# Patient Record
Sex: MALE | Race: WHITE | NOT HISPANIC OR LATINO | Employment: OTHER | URBAN - METROPOLITAN AREA
[De-identification: names, ages, dates, MRNs, and addresses within clinical notes are randomized per-mention and may not be internally consistent; named-entity substitution may affect disease eponyms.]

---

## 2023-03-09 ENCOUNTER — NURSING HOME VISIT (OUTPATIENT)
Dept: POST ACUTE CARE | Facility: EXTERNAL LOCATION | Age: 83
End: 2023-03-09
Payer: MEDICARE

## 2023-03-09 DIAGNOSIS — G30.9 ALZHEIMER DISEASE (MULTI): ICD-10-CM

## 2023-03-09 DIAGNOSIS — U07.1 COVID-19: ICD-10-CM

## 2023-03-09 DIAGNOSIS — J43.9 PULMONARY EMPHYSEMA, UNSPECIFIED EMPHYSEMA TYPE (MULTI): ICD-10-CM

## 2023-03-09 DIAGNOSIS — Z72.0 TOBACCO ABUSE: ICD-10-CM

## 2023-03-09 DIAGNOSIS — E87.1 HYPONATREMIA: ICD-10-CM

## 2023-03-09 DIAGNOSIS — I10 HYPERTENSION, UNSPECIFIED TYPE: ICD-10-CM

## 2023-03-09 DIAGNOSIS — E78.5 HYPERLIPIDEMIA, UNSPECIFIED HYPERLIPIDEMIA TYPE: ICD-10-CM

## 2023-03-09 DIAGNOSIS — F02.80 ALZHEIMER DISEASE (MULTI): ICD-10-CM

## 2023-03-09 DIAGNOSIS — G93.41 METABOLIC ENCEPHALOPATHY: ICD-10-CM

## 2023-03-09 PROCEDURE — 99308 SBSQ NF CARE LOW MDM 20: CPT | Performed by: INTERNAL MEDICINE

## 2023-03-15 PROBLEM — G93.41 METABOLIC ENCEPHALOPATHY: Status: ACTIVE | Noted: 2023-03-15

## 2023-03-15 PROBLEM — G30.9 ALZHEIMER DISEASE (MULTI): Status: ACTIVE | Noted: 2023-03-15

## 2023-03-15 PROBLEM — E78.5 HYPERLIPIDEMIA: Status: ACTIVE | Noted: 2023-03-15

## 2023-03-15 PROBLEM — U07.1 COVID-19: Status: ACTIVE | Noted: 2023-03-15

## 2023-03-15 PROBLEM — E87.1 HYPONATREMIA: Status: ACTIVE | Noted: 2023-03-15

## 2023-03-15 PROBLEM — I10 HYPERTENSION: Status: ACTIVE | Noted: 2023-03-15

## 2023-03-15 PROBLEM — J43.9 PULMONARY EMPHYSEMA (MULTI): Status: ACTIVE | Noted: 2023-03-15

## 2023-03-15 PROBLEM — Z72.0 TOBACCO ABUSE: Status: ACTIVE | Noted: 2023-03-15

## 2023-03-15 PROBLEM — F02.80 ALZHEIMER DISEASE (MULTI): Status: ACTIVE | Noted: 2023-03-15

## 2023-03-15 NOTE — PROGRESS NOTES
Name: Leroy Marquez  : 1940  MRN: [id number]  Visit Date: 3/9/2023  Chief Complaint: HISTORY AND PHYSICAL    HPI: 82 year old Male with PMH of HTN, HLD , Alzheimer, Tobacco abuse, TIA, prediabetes, emphysema who presents to the ED by his family due to falls and abnormal behavior. He is found to have acute metabolic encephalopathy 2/2 COVID 19 viral infection. ID was consulted. He is given remdesivir. He maintained his O2 sat on RA. His mentation returned to baseline. He has hypoNa and it improved with IVF. PT/OT recommended SNF. Once stabilized, he was brought to [facility] for ongoing med mgt and therapy services on 3/7/2023.     Subjective: Seen and examined today. Sitting up in bed. Reviewed hospitalization with him. Questions answered with understanding verbalized. Denies N/V/D/C/CP. No fever or chills.     ROS:  As above in HPI. Otherwise, all other systems have been reviewed and are negative for complaint.    Medications:  Medications reviewed and verified in NH chart.     Past Medical/Surgical History:  HTN, HLD, Alzheimer's Disease, Cellulitis of LLE, Pulmonary emphysema, Tobacco abuse, TIA, prediabetes, emphysema     Family History:  HTN    Social History:  Current Smoker  Denies illicits and ETOH    Vital Signs: reviewed in [facility]    Physical Exam:  Constitutional: Awake, alert, NAD.   Eyes: PERRLA, EOMI. No erythema or exudate. No proptosis or lid lag.   ENMT: MMM, no nasal congestion, no oral lesions, oropharynx clear without  tonsillar erythema or exudate.   Head/Neck: NCAT, neck supple. Full active ROM of the neck. No thyromegaly or  mass. No JVP.   Respiratory/Thorax: CTAB, no increased work of breathing, no increased respiratory effort, no wheeze, rales, or rhonchi.   Cardiovascular: Regular rate and rhythm, normal S1 and S2, no murmurs, rubs, or gallops.   Gastrointestinal: Soft, nontender to palpation, nondistended, no guarding or rebound, normoactive bowel sounds   Musculoskeletal: generalized weakness  appreciated   Extremities: 2+ RPs, no cyanosis or edema   Neurological: Aox2-3, moves all extremities, generalized weakness appreciated   Lymphatic: No lymphadenopathy.   Skin: Warm and well perfused.     Results/Data: reviewed    Provider Impression:   Metabolic Encephalopathy 2/2 COVID-19  - completed remdesivir  - oxygenation remained stable  - mentation returned to baseline    Hyponatremia  - improved with IVF  - monitor labs qwk and PRN    Pulmonary Emphysema  - c/w inhalers  - monitor 02 status    Tobacco Abuse  - counseling given    Alzheimer's Disease  - monitor mental status  - fall precautions  - c/w aricept    HTN, HLD  - continue with amlodipine, benazepril  - monitor vital signs  - c/w statin, baby ASA    Code Status  - Full Code    ----------------  Written by Kesha Garcia LPN, acting as a scribe for Dr. Koroma. This note accurately reflects the work and decisions made by Dr. Koroma.     I, Dr. Koroma, attest all medical record entries made by the scribe were under my direction and were personally dictated by me. I have reviewed the chart and agree that the record accurately reflects my performance of the history, physical exam, and assessment and plan.   .

## 2023-03-23 ENCOUNTER — NURSING HOME VISIT (OUTPATIENT)
Dept: POST ACUTE CARE | Facility: EXTERNAL LOCATION | Age: 83
End: 2023-03-23
Payer: MEDICARE

## 2023-03-23 DIAGNOSIS — J43.9 PULMONARY EMPHYSEMA, UNSPECIFIED EMPHYSEMA TYPE (MULTI): ICD-10-CM

## 2023-03-23 DIAGNOSIS — G30.9 ALZHEIMER DISEASE (MULTI): ICD-10-CM

## 2023-03-23 DIAGNOSIS — Z74.09 IMPAIRED FUNCTIONAL MOBILITY AND ACTIVITY TOLERANCE: ICD-10-CM

## 2023-03-23 DIAGNOSIS — F02.80 ALZHEIMER DISEASE (MULTI): ICD-10-CM

## 2023-03-23 DIAGNOSIS — U07.1 COVID-19: ICD-10-CM

## 2023-03-23 DIAGNOSIS — G93.41 METABOLIC ENCEPHALOPATHY: ICD-10-CM

## 2023-03-23 DIAGNOSIS — I15.9 SECONDARY HYPERTENSION: ICD-10-CM

## 2023-03-23 DIAGNOSIS — E87.1 HYPONATREMIA: ICD-10-CM

## 2023-03-23 PROCEDURE — 99308 SBSQ NF CARE LOW MDM 20: CPT | Performed by: INTERNAL MEDICINE

## 2023-03-30 PROBLEM — Z74.09 IMPAIRED FUNCTIONAL MOBILITY AND ACTIVITY TOLERANCE: Status: ACTIVE | Noted: 2023-03-30

## 2023-03-30 NOTE — PROGRESS NOTES
Name: Leroy Marquez  : 1940  MRN: [id number]  Visit Date: 3/23/2023  Chief Complaint: Weekly SNF Physician Visit    HPI: 82 year old Male with PMH of HTN, HLD , Alzheimer, Tobacco abuse, TIA, prediabetes, emphysema who presents to the ED by his family due to falls and abnormal behavior. He is found to have acute metabolic encephalopathy 2/2 COVID 19 viral infection. ID was consulted. He is given remdesivir. He maintained his O2 sat on RA. His mentation returned to baseline. He has hypoNa and it improved with IVF. PT/OT recommended SNF. Once stabilized, he was brought to [facility] for ongoing med mgt and therapy services on 3/7/2023.     Subjective: Seen and examined today. Sitting up in bed. Reports that he is feeling ok. Denies N/V/D/C/CP. No fever or chills.  Nursing reports no issues.     ROS:  As above in HPI. Otherwise, all other systems have been reviewed and are negative for complaint.    Medications:  Medications reviewed and verified in NH chart.     Vital Signs: reviewed in [facility]    Physical Exam:  Constitutional: Awake, alert, NAD.   Eyes: PERRLA, EOMI. No erythema or exudate. No proptosis or lid lag.   ENMT: MMM, no nasal congestion, no oral lesions, oropharynx clear without  tonsillar erythema or exudate.   Head/Neck: NCAT, neck supple. Full active ROM of the neck. No thyromegaly or  mass. No JVP.   Respiratory/Thorax: CTAB, no increased work of breathing, no increased respiratory effort, no wheeze, rales, or rhonchi.   Cardiovascular: Regular rate and rhythm, normal S1 and S2, no murmurs, rubs, or gallops.   Gastrointestinal: Soft, nontender to palpation, nondistended, no guarding or rebound, normoactive bowel sounds   Musculoskeletal: generalized weakness appreciated   Extremities: 2+ RPs, no cyanosis or edema   Neurological: Aox2-3, moves all extremities, generalized weakness appreciated   Lymphatic: No lymphadenopathy.   Skin: Warm and well perfused.     Results/Data: reviewed    Provider Impression:    Metabolic Encephalopathy 2/2 COVID-19  - completed remdesivir  - oxygenation remained stable  - mentation returned to baseline    Hyponatremia  - improved with IVF  - monitor labs qwk and PRN    IFM, Weakness  - consulted PT OT to eval and tx    Pulmonary Emphysema  - c/w inhalers  - monitor 02 status    Tobacco Abuse  - counseling given    Alzheimer's Disease  - monitor mental status  - fall precautions  - c/w aricept    HTN, HLD  - continue with amlodipine, benazepril  - monitor vital signs  - c/w statin, baby ASA    Code Status  - Full Code  ----------------  Written by Kesha Garcia LPN, acting as a scribe for Dr. Koroma. This note accurately reflects the work and decisions made by Dr. Koroma.     I, Dr. Koroma, attest all medical record entries made by the scribe were under my direction and were personally dictated by me. I have reviewed the chart and agree that the record accurately reflects my performance of the history, physical exam, and assessment and plan.